# Patient Record
(demographics unavailable — no encounter records)

---

## 2025-02-24 NOTE — ADDENDUM
[FreeTextEntry1] : This note was partly authored by José Miguel Louis working as a scribe for NOEL Stovall. I, NOEL Stovall, have reviewed the content of this note and confirm it is true and accurate. I personally performed the history and physical examination and made all the decisions. 02/24/2025.

## 2025-02-24 NOTE — ASSESSMENT
[FreeTextEntry1] : Not symptomatic of UTI today. Urine sent for UA w Reflex Culture.   --MIKE today: Unremarkable exam. Both kidneys are normal in size and echogenicity without hydronephrosis, stones or solid masses present.  F/U in one year for routine MIKE

## 2025-02-24 NOTE — HISTORY OF PRESENT ILLNESS
[FreeTextEntry1] : 02/24/2025: DWAINE MARLEY is a 30-year-old Female with h/o One incidence renal stone that she spontaneously passed  Pt was seen 1 year ago with obstructed RK on a CT A/P with iv contrast on 9/27/23 after presenting to [facility] w 3 d h/o right flank pain w no prior known h/o stones She was found to have a delayed nephrogram and a duplicated RK w obstructed upper pole due to a 5 mm distal uvj stone. There were no temps or GH.  MIKE and XRay KUB on 1/19/24 documented resolution of stone and hydro.   Pt returns today for a routine MIKE.  Reports doing well.   No UTI-like symptoms.  No cloudy, burning or malodorous urine.